# Patient Record
Sex: MALE | Race: OTHER | URBAN - METROPOLITAN AREA
[De-identification: names, ages, dates, MRNs, and addresses within clinical notes are randomized per-mention and may not be internally consistent; named-entity substitution may affect disease eponyms.]

---

## 2018-11-12 ENCOUNTER — HOSPITAL ENCOUNTER (OUTPATIENT)
Dept: LAB | Age: 63
Discharge: HOME | End: 2018-11-12

## 2018-11-12 LAB
25(OH)D3 SERPL-MCNC: 34.5 NG/ML (ref 30–100)
ALBUMIN SERPL-MCNC: 4.1 G/DL (ref 3.5–5)
ALBUMIN/GLOB SERPL: 1.4 {RATIO} (ref 1.1–2.2)
ALP SERPL-CCNC: 63 U/L (ref 45–117)
ALT SERPL-CCNC: 31 U/L (ref 12–78)
ANION GAP SERPL CALC-SCNC: 8 MMOL/L (ref 5–15)
APPEARANCE UR: CLEAR
AST SERPL-CCNC: 29 U/L (ref 15–37)
BACTERIA URNS QL MICRO: NEGATIVE /HPF
BASOPHILS # BLD: 0.1 K/UL (ref 0–0.1)
BASOPHILS NFR BLD: 1 % (ref 0–1)
BILIRUB SERPL-MCNC: 0.8 MG/DL (ref 0.2–1)
BILIRUB UR QL: NEGATIVE
BUN SERPL-MCNC: 14 MG/DL (ref 6–20)
BUN/CREAT SERPL: 17 (ref 12–20)
CALCIUM SERPL-MCNC: 8.6 MG/DL (ref 8.5–10.1)
CHLORIDE SERPL-SCNC: 99 MMOL/L (ref 97–108)
CHOLEST SERPL-MCNC: 127 MG/DL
CO2 SERPL-SCNC: 26 MMOL/L (ref 21–32)
COLOR UR: ABNORMAL
CREAT SERPL-MCNC: 0.81 MG/DL (ref 0.7–1.3)
CREAT UR-MCNC: 28.9 MG/DL
DIFFERENTIAL METHOD BLD: ABNORMAL
EOSINOPHIL # BLD: 0.1 K/UL (ref 0–0.4)
EOSINOPHIL NFR BLD: 1 % (ref 0–7)
EPITH CASTS URNS QL MICRO: ABNORMAL /LPF
ERYTHROCYTE [DISTWIDTH] IN BLOOD BY AUTOMATED COUNT: 13.5 % (ref 11.5–14.5)
EST. AVERAGE GLUCOSE BLD GHB EST-MCNC: 100 MG/DL
GLOBULIN SER CALC-MCNC: 3 G/DL (ref 2–4)
GLUCOSE SERPL-MCNC: 88 MG/DL (ref 65–100)
GLUCOSE UR STRIP.AUTO-MCNC: NEGATIVE MG/DL
HAV IGM SER QL: NONREACTIVE
HBA1C MFR BLD: 5.1 % (ref 4.2–6.3)
HBV CORE IGM SER QL: NONREACTIVE
HBV SURFACE AG SER QL: <0.1 INDEX
HBV SURFACE AG SER QL: NEGATIVE
HCT VFR BLD AUTO: 41.6 % (ref 36.6–50.3)
HCV AB SERPL QL IA: NONREACTIVE
HCV COMMENT,HCGAC: NORMAL
HDLC SERPL-MCNC: 53 MG/DL
HDLC SERPL: 2.4 {RATIO} (ref 0–5)
HGB BLD-MCNC: 14.2 G/DL (ref 12.1–17)
HGB UR QL STRIP: ABNORMAL
HIV 1+2 AB+HIV1 P24 AG SERPL QL IA: NONREACTIVE
HIV12 RESULT COMMENT, HHIVC: NORMAL
IMM GRANULOCYTES # BLD: 0.1 K/UL (ref 0–0.04)
IMM GRANULOCYTES NFR BLD AUTO: 1 % (ref 0–0.5)
KETONES UR QL STRIP.AUTO: NEGATIVE MG/DL
LDLC SERPL CALC-MCNC: 60.2 MG/DL (ref 0–100)
LEUKOCYTE ESTERASE UR QL STRIP.AUTO: NEGATIVE
LIPID PROFILE,FLP: NORMAL
LYMPHOCYTES # BLD: 0.7 K/UL (ref 0.8–3.5)
LYMPHOCYTES NFR BLD: 10 % (ref 12–49)
MAGNESIUM SERPL-MCNC: 2 MG/DL (ref 1.6–2.4)
MCH RBC QN AUTO: 30 PG (ref 26–34)
MCHC RBC AUTO-ENTMCNC: 34.1 G/DL (ref 30–36.5)
MCV RBC AUTO: 87.9 FL (ref 80–99)
MICROALBUMIN UR-MCNC: <0.5 MG/DL
MICROALBUMIN/CREAT UR-RTO: NORMAL MG/G (ref 0–30)
MONOCYTES # BLD: 0.9 K/UL (ref 0–1)
MONOCYTES NFR BLD: 13 % (ref 5–13)
NEUTS SEG # BLD: 4.9 K/UL (ref 1.8–8)
NEUTS SEG NFR BLD: 74 % (ref 32–75)
NITRITE UR QL STRIP.AUTO: NEGATIVE
NRBC # BLD: 0 K/UL (ref 0–0.01)
NRBC BLD-RTO: 0 PER 100 WBC
PH UR STRIP: 6.5 [PH] (ref 5–8)
PLATELET # BLD AUTO: 233 K/UL (ref 150–400)
PMV BLD AUTO: 10.7 FL (ref 8.9–12.9)
POTASSIUM SERPL-SCNC: 4.3 MMOL/L (ref 3.5–5.1)
PROT SERPL-MCNC: 7.1 G/DL (ref 6.4–8.2)
PROT UR STRIP-MCNC: NEGATIVE MG/DL
PSA SERPL-MCNC: 0 NG/ML (ref 0.01–4)
RBC # BLD AUTO: 4.73 M/UL (ref 4.1–5.7)
RBC #/AREA URNS HPF: ABNORMAL /HPF (ref 0–5)
RBC MORPH BLD: ABNORMAL
SODIUM SERPL-SCNC: 133 MMOL/L (ref 136–145)
SP GR UR REFRACTOMETRY: 1.01 (ref 1–1.03)
SP1: NORMAL
SP2: NORMAL
SP3: NORMAL
TRIGL SERPL-MCNC: 69 MG/DL (ref ?–150)
TSH SERPL DL<=0.05 MIU/L-ACNC: 2.67 UIU/ML (ref 0.36–3.74)
URATE SERPL-MCNC: 3.8 MG/DL (ref 3.5–7.2)
UROBILINOGEN UR QL STRIP.AUTO: 0.2 EU/DL (ref 0.2–1)
VLDLC SERPL CALC-MCNC: 13.8 MG/DL
WBC # BLD AUTO: 6.8 K/UL (ref 4.1–11.1)
WBC URNS QL MICRO: ABNORMAL /HPF (ref 0–4)

## 2018-11-12 PROCEDURE — 83036 HEMOGLOBIN GLYCOSYLATED A1C: CPT

## 2018-11-12 PROCEDURE — 83735 ASSAY OF MAGNESIUM: CPT

## 2018-11-12 PROCEDURE — 84443 ASSAY THYROID STIM HORMONE: CPT

## 2018-11-12 PROCEDURE — 82043 UR ALBUMIN QUANTITATIVE: CPT

## 2018-11-12 PROCEDURE — 80061 LIPID PANEL: CPT

## 2018-11-12 PROCEDURE — 80074 ACUTE HEPATITIS PANEL: CPT

## 2018-11-12 PROCEDURE — 82570 ASSAY OF URINE CREATININE: CPT

## 2018-11-12 PROCEDURE — 84153 ASSAY OF PSA TOTAL: CPT

## 2018-11-12 PROCEDURE — 80053 COMPREHEN METABOLIC PANEL: CPT

## 2018-11-12 PROCEDURE — 82306 VITAMIN D 25 HYDROXY: CPT

## 2018-11-12 PROCEDURE — 85025 COMPLETE CBC W/AUTO DIFF WBC: CPT

## 2018-11-12 PROCEDURE — 86592 SYPHILIS TEST NON-TREP QUAL: CPT

## 2018-11-12 PROCEDURE — 84550 ASSAY OF BLOOD/URIC ACID: CPT

## 2018-11-12 PROCEDURE — 81001 URINALYSIS AUTO W/SCOPE: CPT

## 2018-11-12 PROCEDURE — 87389 HIV-1 AG W/HIV-1&-2 AB AG IA: CPT

## 2018-11-13 LAB — RPR SER QL: NONREACTIVE

## 2019-03-04 ENCOUNTER — OFFICE VISIT (OUTPATIENT)
Dept: SLEEP MEDICINE | Age: 64
End: 2019-03-04

## 2019-03-04 VITALS
SYSTOLIC BLOOD PRESSURE: 186 MMHG | BODY MASS INDEX: 25.12 KG/M2 | DIASTOLIC BLOOD PRESSURE: 99 MMHG | OXYGEN SATURATION: 98 % | HEIGHT: 75 IN | WEIGHT: 202 LBS | HEART RATE: 81 BPM

## 2019-03-04 DIAGNOSIS — I10 ESSENTIAL HYPERTENSION: ICD-10-CM

## 2019-03-04 DIAGNOSIS — G47.33 OSA (OBSTRUCTIVE SLEEP APNEA): Primary | ICD-10-CM

## 2019-03-04 RX ORDER — SIMVASTATIN 40 MG/1
TABLET, FILM COATED ORAL
COMMUNITY

## 2019-03-04 RX ORDER — LOSARTAN POTASSIUM AND HYDROCHLOROTHIAZIDE 12.5; 5 MG/1; MG/1
1 TABLET ORAL DAILY
COMMUNITY

## 2019-03-04 RX ORDER — ASPIRIN 81 MG/1
TABLET ORAL DAILY
COMMUNITY

## 2019-03-04 NOTE — PATIENT INSTRUCTIONS

## 2019-03-04 NOTE — PROGRESS NOTES
[address] [facility] [place]. [place], [address]  Tel.  [phone]  Fax. [address] [facility] [address]  [place], [address] [facility]  Tel.  [phone]  Fax. [phone] [address] Medel   Tel.  [phone]  Fax. [phone]       Chief Complaint       Chief Complaint   Patient presents with    Sleep Problem     NP refd by Dr. Wesly Higuera for witnessed apnea       HPI      Jair Jo is a  60 y.o.  male seen for evaluation of a sleep disorder  . The patient reports he has experienced gasping respirations, snoring, non-restorative sleep, nocturnal awakening, witnessed apnea. The patient retires at 6 pm-12 am and awakens at 3-4 am; at times he will awaken at 5-7 am/. The patient notes that he will not experience frequent awakening from sleep. In general he is able to return to sleep after awakening. he tends to awaken spontaneously. He works from 1-9 pm ( at times later). He will often drink several beers or several shot of alcohol to \" calm down\" from work. He notes vivid dreams, nightmares, sleep talking. He denies bruxism or nocturnal incontinence, abnormal arm or leg movements, hypnagogic hallucinations, sleep paralysis or cataplexy. The patient has not undergone diagnostic testing for the current problems. [place] Sleepiness Score: 5       No Known Allergies    Current Outpatient Medications   Medication Sig Dispense Refill    losartan-hydroCHLOROthiazide (HYZAAR) 50-12.5 mg per tablet Take 1 Tab by mouth daily.  simvastatin (ZOCOR) 40 mg tablet Take  by mouth nightly.  aspirin delayed-release 81 mg tablet Take  by mouth daily. He  has no past medical history on file. He  has a past surgical history that includes hx gi and hx urological.    He family history is not on file. He  reports that  has never smoked. he has never used smokeless tobacco. He reports that he drinks alcohol.      Review of Systems:  Review of Systems   Constitutional: Negative for chills and fever. HENT: Negative for hearing loss and tinnitus. Eyes: Negative for blurred vision and double vision. Respiratory: Negative for cough and shortness of breath. Cardiovascular: Negative for chest pain and palpitations. Gastrointestinal: Negative for abdominal pain and heartburn. Genitourinary: Negative for frequency and urgency. Musculoskeletal: Negative for back pain and neck pain. Skin: Negative for itching and rash. Neurological: Negative for dizziness and headaches. Psychiatric/Behavioral: Negative for depression. Objective:     Visit Vitals  BP (!) 186/99 (BP 1 Location: Left arm, BP Patient Position: Sitting)   Pulse 81   Ht 6' 3\" (1.905 m)   Wt 202 lb (91.6 kg)   SpO2 98%   BMI 25.25 kg/m²     Body mass index is 25.25 kg/m². General:   Conversant, cooperative   Eyes:  Pupils equal and reactive, no nystagmus   Oropharynx:   Mallampati score III, tongue normal   Tonsils:      Neck:   No carotid bruits; Neck circ. in \"inches\": 15   Chest/Lungs:  Clear on auscultation    CVS:  Normal rate, regular rhythm   Skin:  Warm to touch; no obvious rashes   Neuro:  Speech fluent, face symmetrical, tongue movement normal   Psych:  Normal affect,  normal countenance        Assessment:       ICD-10-CM ICD-9-CM    1. ASHLEY (obstructive sleep apnea) G47.33 327.23      History of snoring, witnessed apnea consistent with sleep disordered breathing. He will often consume alcohol in the evening before bedtime which could potentiate sleep breathing abnormalities. He will be evaluated with a home sleep test.  The results will be reviewed with him. Plan:     No orders of the defined types were placed in this encounter. * Patient has a history and examination consistent with the diagnosis of sleep apnea. *Home sleep testing was ordered for initial evaluation.     * He was provided information on sleep apnea including corresponding risk factors and the importance of proper treatment. * Treatment options if indicated were reviewed today. Instructions:  o Do not engage in activities requiring a normal degree of alertness if fatigue is present. o The patient understands that untreated or undertreated sleep apnea could impair judgement and the ability to function normally during the day.  o Call or return if symptoms worsen or persist.          Franco Herman MD, FAASM  Electronically signed 03/04/19       This note was created using voice recognition software. Despite editing, there may be syntax errors. This note will not be viewable in [address]

## 2019-03-18 ENCOUNTER — TELEPHONE (OUTPATIENT)
Dept: SLEEP MEDICINE | Age: 64
End: 2019-03-18

## 2019-05-14 ENCOUNTER — TELEPHONE (OUTPATIENT)
Dept: SLEEP MEDICINE | Age: 64
End: 2019-05-14

## 2019-05-14 NOTE — TELEPHONE ENCOUNTER
Phoned MAXINE Holden for Christine Sloan at ext [phone] to call our office to reinitiate authorization.

## 2019-05-14 NOTE — TELEPHONE ENCOUNTER
Patient called wanting to  HSAT but authorization  on 3/27/19. Will need new PA prior to being scheduled for . Patient said he spoke with Chastity Mckeon at [facility] who is aware of his case and can be reached at  [address]

## 2019-05-20 NOTE — TELEPHONE ENCOUNTER
MAXINE with Cezar Piedra at [facility] requesting a return call to extend the authorization date for the patient to  an HST.

## 2019-05-28 NOTE — TELEPHONE ENCOUNTER
Patient called to follow up on HSAT authorization. Advised him that we needed to get the authorization dates on file adjusted. [id number] ([facility]), they stated that we needed to re-initiate the case. Re-initiated the case for [phone] over the phone, faxed clinicals to [phone] case ID is [id number]. Called and notified patient of the status as well.

## 2019-06-03 NOTE — TELEPHONE ENCOUNTER
[phone] ([facility]) to follow up [employer]. Called and LVM to schedule HSAT pickup and education.

## 2019-06-11 ENCOUNTER — DOCUMENTATION ONLY (OUTPATIENT)
Dept: SLEEP MEDICINE | Age: 64
End: 2019-06-11

## 2019-06-12 ENCOUNTER — TELEPHONE (OUTPATIENT)
Dept: SLEEP MEDICINE | Age: 64
End: 2019-06-12

## 2019-06-12 DIAGNOSIS — G47.33 OSA (OBSTRUCTIVE SLEEP APNEA): Primary | ICD-10-CM

## 2019-06-21 NOTE — TELEPHONE ENCOUNTER
HSAT: suboptimal study with periods of absent pulse ox, effort signal.     Recommendation: repeat study.

## 2019-06-27 ENCOUNTER — HOSPITAL ENCOUNTER (OUTPATIENT)
Dept: SLEEP MEDICINE | Age: 64
Discharge: HOME | End: 2019-06-27
Payer: MEDICARE

## 2019-06-27 PROCEDURE — 95806 SLEEP STUDY UNATT&RESP EFFT: CPT | Performed by: SPECIALIST

## 2019-06-28 ENCOUNTER — TELEPHONE (OUTPATIENT)
Dept: SLEEP MEDICINE | Age: 64
End: 2019-06-28

## 2019-06-28 DIAGNOSIS — G47.33 OSA (OBSTRUCTIVE SLEEP APNEA): Primary | ICD-10-CM

## 2019-07-03 NOTE — TELEPHONE ENCOUNTER
HSAT demonstrated sleep disordered breathing characterized by an overall AHI of 7.6/h per hour associated with minimal SaO2 77%. Events were more prominent supine with the supine-related AHI of 10/h. Snoring during 38.8% of the study. Recommendation: APAP 6-15 cm    Chief technologist: Please review the HSAT results with the patient. Order has been generated for APAP 6-15 cm. Please schedule first compliance appointment.

## 2019-07-15 ENCOUNTER — DOCUMENTATION ONLY (OUTPATIENT)
Dept: SLEEP MEDICINE | Age: 64
End: 2019-07-15

## 2019-07-16 ENCOUNTER — TELEPHONE (OUTPATIENT)
Dept: SLEEP MEDICINE | Age: 64
End: 2019-07-16

## 2019-12-19 ENCOUNTER — DOCUMENTATION ONLY (OUTPATIENT)
Dept: SLEEP MEDICINE | Age: 64
End: 2019-12-19

## 2019-12-19 NOTE — PROGRESS NOTES
Left message to call back to give us reason why he returned the PAP device so we can document and inform  as well.

## 2019-12-26 ENCOUNTER — TELEPHONE (OUTPATIENT)
Dept: SLEEP MEDICINE | Age: 64
End: 2019-12-26

## 2019-12-26 NOTE — TELEPHONE ENCOUNTER
Received fax from DME stating patient returned PAP device. FYI. Also, left message for patient to call back to find out why he returned device. Patient did no call back.